# Patient Record
Sex: FEMALE
[De-identification: names, ages, dates, MRNs, and addresses within clinical notes are randomized per-mention and may not be internally consistent; named-entity substitution may affect disease eponyms.]

---

## 2018-09-14 ENCOUNTER — HOSPITAL (OUTPATIENT)
Dept: OTHER | Age: 33
End: 2018-09-14
Attending: ORTHOPAEDIC SURGERY

## 2018-10-01 ENCOUNTER — HOSPITAL (OUTPATIENT)
Dept: OTHER | Age: 33
End: 2018-10-01
Attending: ORTHOPAEDIC SURGERY

## 2019-05-17 ENCOUNTER — IMPORTED ENCOUNTER (OUTPATIENT)
Dept: URBAN - METROPOLITAN AREA CLINIC 31 | Facility: CLINIC | Age: 34
End: 2019-05-17

## 2019-05-17 PROBLEM — H17.89: Noted: 2019-05-17

## 2019-05-17 PROCEDURE — 92004 COMPRE OPH EXAM NEW PT 1/>: CPT

## 2019-05-17 PROCEDURE — 92015 DETERMINE REFRACTIVE STATE: CPT

## 2019-05-17 NOTE — PATIENT DISCUSSION
1.  S/P  Lasik OU dist:  slight regresion2. Disp trials of more most cls--gave samples of My day and Oasys 1 day -0.75/-0.50.  3.  Esophore--- Notices diplopia only at night and especially if tired or has had a drink. Disc dist rx for drivng at night and prism BO4. Dry Eyes-- use tears 2-4x per day5.   RTN 1 yr [facility]

## 2019-08-09 NOTE — PROCEDURE NOTE: CLINICAL
PROCEDURE NOTE: Laser for Retinal Tear OS. Diagnosis: Horseshoe Tear of Retina Without Detachment. Prior to laser, risks/benefits/alternatives to laser discussed including loss of vision, decreased peripheral and night vision, need for more laser and/or surgery and patient wished to proceed. An informed consent was obtained and no assurances or guarantees were given. Spot size:200 um. Pulse power: 200 mW. Pulse duration: 100 ms. Number of pulses: 311. Procedure Time: 5:04PM. Patient tolerated procedure well. There were no complications. Post-op instructions given. Patient given office phone number/answering service number and advised to call immediately should there be loss of vision or pain, or should they have any other questions or concerns. Felecia Beauchamp

## 2019-08-21 NOTE — PROCEDURE NOTE: CLINICAL
PROCEDURE NOTE: Laser for Retinal Tear OS. Diagnosis: Horseshoe Tear of Retina Without Detachment. Prior to laser, risks/benefits/alternatives to laser discussed including loss of vision, decreased peripheral and night vision, need for more laser and/or surgery and patient wished to proceed. An informed consent was obtained and no assurances or guarantees were given. Spot size: 0 um. Pulse power: 300 mW. Pulse duration: 150 ms. Number of pulses: 176. Procedure Time: 391. Patient tolerated procedure well. There were no complications. Post-op instructions given. Patient given office phone number/answering service number and advised to call immediately should there be loss of vision or pain, or should they have any other questions or concerns. Soraida Sue

## 2019-08-28 NOTE — PATIENT DISCUSSION
Capsular haze appears visually significant, RECOMMEND CONSULT with DR BERG for possible YAG capsulotomy IN 1-2 MONTHS TO ALLOW TEAR TIME TO HEAL.

## 2022-03-03 NOTE — PATIENT DISCUSSION
BMI above normal limits, recommended weight loss, improve diet and follow up with internist.
Cautioned may occur in other eye in future.
Discussed the importance of blood sugar control in the prevention of ocular complications.
My findings and recommendations are based on patient's symptoms, eye exam, diagnostic testing, and records.
No retinal holes or tears seen on exam. Recommended OBSERVATION. We reviewed the signs and symptoms of retinal tear/retinal detachment and the importance of prompt evaluation should there be increasing floaters, new flashing lights, or decreasing peripheral vision in either eye at any time. Patient understands condition, prognosis and need for follow up care.
Patient knows to call immediately if decreased vision, pain, or ocular redness.
Patient understands condition, prognosis and need for follow up care.
Recommended OBSERVATION and MONITORING for change.
Recommended laser to hole. Discussed alternatives/benefits/risks to include development of new tears, tears along the edge of treated area, and retinal detachment.
Retinal exam findings communicated to Physician managing diabetes.
Well positioned.
No

## 2022-04-01 ASSESSMENT — VISUAL ACUITY
OD_PH: SC 20/30 -1
OS_SC: 20/20
OD_SC: 20/20
OD_CC: 20/70-1
OS_CC: 20/20-2

## 2022-04-01 ASSESSMENT — TONOMETRY
OD_IOP_MMHG: 12
OS_IOP_MMHG: 12
